# Patient Record
Sex: FEMALE | Race: WHITE | NOT HISPANIC OR LATINO | Employment: STUDENT | URBAN - METROPOLITAN AREA
[De-identification: names, ages, dates, MRNs, and addresses within clinical notes are randomized per-mention and may not be internally consistent; named-entity substitution may affect disease eponyms.]

---
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
## 2025-03-18 ENCOUNTER — OFFICE VISIT (OUTPATIENT)
Dept: PULMONOLOGY | Facility: CLINIC | Age: 20
End: 2025-03-18
Payer: COMMERCIAL

## 2025-03-18 VITALS
DIASTOLIC BLOOD PRESSURE: 90 MMHG | HEIGHT: 66 IN | WEIGHT: 172 LBS | TEMPERATURE: 98.6 F | SYSTOLIC BLOOD PRESSURE: 134 MMHG | RESPIRATION RATE: 18 BRPM | OXYGEN SATURATION: 97 % | HEART RATE: 90 BPM | BODY MASS INDEX: 27.64 KG/M2

## 2025-03-18 DIAGNOSIS — R06.02 SHORTNESS OF BREATH: Primary | ICD-10-CM

## 2025-03-18 DIAGNOSIS — J45.30 MILD PERSISTENT ASTHMA WITHOUT COMPLICATION: ICD-10-CM

## 2025-03-18 PROCEDURE — 99204 OFFICE O/P NEW MOD 45 MIN: CPT | Performed by: INTERNAL MEDICINE

## 2025-03-18 RX ORDER — BUDESONIDE AND FORMOTEROL FUMARATE DIHYDRATE 80; 4.5 UG/1; UG/1
2 AEROSOL RESPIRATORY (INHALATION) 2 TIMES DAILY
Qty: 10.2 G | Refills: 11 | Status: SHIPPED | OUTPATIENT
Start: 2025-03-18

## 2025-03-18 RX ORDER — LEVALBUTEROL TARTRATE 45 UG/1
3 AEROSOL, METERED ORAL ONCE
Status: COMPLETED | OUTPATIENT
Start: 2025-03-18 | End: 2025-03-18

## 2025-03-18 RX ADMIN — LEVALBUTEROL TARTRATE 3 PUFF: 45 AEROSOL, METERED ORAL at 15:44

## 2025-03-18 NOTE — PROGRESS NOTES
New Patient Pulmonary Office Visit      Patient Name: Harriet Alvarez    Referring Physician: Ashlyn Morales, *    Chief Complaint:    Chief Complaint   Patient presents with    Wheezing    Shortness of Breath    Cough       History of Present Illness: Harriet Alvarez is a 20 y.o. female who is here today to establish care with Pulmonary.  Patient has significant past medical history.  He was referred to pulmonary for evaluation of wheezing.  Patient states that she has been getting sick quite a bit here recently denies any chest pain, nausea, fever, or chills.  Patient does vape.    Review of Systems:   Review of Systems   Constitutional:  Negative for chills, fatigue and fever.   HENT:  Negative for congestion and voice change.    Eyes:  Negative for blurred vision.   Respiratory:  Negative for cough, shortness of breath and wheezing.    Cardiovascular:  Negative for chest pain.   Skin:  Negative for dry skin.   Hematological:  Negative for adenopathy.   Psychiatric/Behavioral:  Negative for agitation and depressed mood.        Past Medical History:   Past Medical History:   Diagnosis Date    Pneumonia        Past Surgical History:   Past Surgical History:   Procedure Laterality Date    ADENOIDECTOMY      TONSILLECTOMY         Family History: History reviewed. No pertinent family history.    Social History:   Social History     Socioeconomic History    Marital status: Single   Tobacco Use    Smoking status: Never     Passive exposure: Never    Smokeless tobacco: Never   Vaping Use    Vaping status: Every Day    Substances: Nicotine, THC, Flavoring    Devices: Disposable    Passive vaping exposure: Yes   Substance and Sexual Activity    Alcohol use: Never    Drug use: Never    Sexual activity: Defer       Medications:     Current Outpatient Medications:     albuterol sulfate  (90 Base) MCG/ACT inhaler, Inhale 2 puffs Every 6 (Six) Hours As Needed for Wheezing or Shortness of Air., Disp: 6.7 g, Rfl:  "0    norgestimate-ethinyl estradiol (Sprintec 28) 0.25-35 MG-MCG per tablet, Take 1 tablet by mouth Daily., Disp: , Rfl:     PARoxetine (PAXIL) 30 MG tablet, , Disp: , Rfl:     budesonide-formoterol (SYMBICORT) 80-4.5 MCG/ACT inhaler, Inhale 2 puffs 2 (Two) Times a Day., Disp: 10.2 g, Rfl: 11    Allergies:   No Known Allergies    Physical Exam:  Vital Signs:   Vitals:    03/18/25 1134   BP: 134/90   BP Location: Left arm   Patient Position: Sitting   Cuff Size: Adult   Pulse: 90   Resp: 18   Temp: 98.6 °F (37 °C)   SpO2: 97%  Comment: room air at resting   Weight: 78 kg (172 lb)   Height: 167.6 cm (65.98\")       Physical Exam  Vitals and nursing note reviewed.   Constitutional:       General: She is not in acute distress.     Appearance: She is well-developed and normal weight. She is not ill-appearing or toxic-appearing.   HENT:      Head: Normocephalic and atraumatic.   Cardiovascular:      Rate and Rhythm: Normal rate and regular rhythm.      Pulses: Normal pulses.      Heart sounds: Normal heart sounds. No murmur heard.     No friction rub. No gallop.   Pulmonary:      Effort: Pulmonary effort is normal. No respiratory distress.      Breath sounds: Normal breath sounds. No wheezing, rhonchi or rales.   Musculoskeletal:      Right lower leg: No edema.      Left lower leg: No edema.   Skin:     General: Skin is warm and dry.   Neurological:      Mental Status: She is alert and oriented to person, place, and time.         Immunization History   Administered Date(s) Administered    31-influenza Vac Quardvalent Preservativ 11/12/2019    COVID-19 (PFIZER) 12YRS+ (COMIRNATY) 10/15/2024    COVID-19 (PFIZER) Purple Cap Monovalent 05/14/2021, 01/01/2022    DTaP 2004, 2004, 2004, 01/24/2006, 04/18/2008    DTaP, Unspecified 04/18/2008, 01/12/2009    FluMist 2-49yrs 12/17/2015    Fluzone (or Fluarix & Flulaval for VFC) >6mos 11/10/2017, 10/12/2022, 01/15/2024    HPV Quadrivalent 08/05/2014    Hep A, 2 Dose " 01/25/2018, 07/25/2018, 07/26/2018, 03/07/2019    Hep B, Adolescent or Pediatric 02/23/2009    Hep B, Unspecified 2004, 2004, 03/08/2005    HiB 2004, 2004, 05/08/2005    Hpv9 01/25/2018    IPV 2004, 2004, 2004, 04/18/2008, 01/12/2009    Influenza recombinant 10/15/2024    Influenza, Unspecified 09/01/2020    MMR 03/08/2005, 04/18/2008, 01/12/2009, 02/23/2009    Meningococcal MCV4P (Menactra) 01/25/2018, 01/27/2020    Tdap 08/07/2015, 01/25/2018    Trumenba(meningococcal B) 04/30/2020, 08/19/2021    Varicella 01/24/2006, 01/12/2009, 09/14/2012       Results Review:   - I personally reviewed the pts imaging from chest x-ray from 2/15/2025 shows no acute cardiopulmonary process.  - I personally reviewed the pts PFT from 3/18/2005 shows mild obstruction without restriction, normal DLCO.  - I personally reviewed the pts chart with regards to evaluation by urgent treatment center for acute viral illness.    Assessment / Plan:   Diagnoses and all orders for this visit:    1. Shortness of breath (Primary)  2. Mild persistent asthma without complication  -Patient appears to have asthma was noted on Symbicort 80 mcg 2 puffs twice daily with albuterol every 4 hours as needed.  Right now she is using albuterol 6 times a day.  She does find that there is benefit though.  I like her to try to come off of the Symbicort though in about 3 months to see if she has ongoing symptoms.  Also highly recommended to stop vaping.  Patient verbalized understanding agree with the plan.      Follow Up:   Return in about 4 months (around 7/18/2025).     NEREIDA Palacios, DO  Pulmonary and Critical Care Medicine  Note Electronically Signed    Part of this note may be an electronic transcription/translation of spoken language to printed text using the Dragon Dictation System.

## 2025-06-05 ENCOUNTER — HOSPITAL ENCOUNTER (EMERGENCY)
Facility: HOSPITAL | Age: 20
Discharge: HOME | End: 2025-06-05
Attending: EMERGENCY MEDICINE
Payer: COMMERCIAL

## 2025-06-05 VITALS
OXYGEN SATURATION: 96 % | RESPIRATION RATE: 18 BRPM | HEIGHT: 66 IN | SYSTOLIC BLOOD PRESSURE: 117 MMHG | BODY MASS INDEX: 28.12 KG/M2 | WEIGHT: 175 LBS | TEMPERATURE: 99 F | DIASTOLIC BLOOD PRESSURE: 76 MMHG | HEART RATE: 87 BPM

## 2025-06-05 DIAGNOSIS — R55 NEAR SYNCOPE: Primary | ICD-10-CM

## 2025-06-05 DIAGNOSIS — R55 POSTURAL DIZZINESS WITH NEAR SYNCOPE: ICD-10-CM

## 2025-06-05 DIAGNOSIS — R42 POSTURAL DIZZINESS WITH NEAR SYNCOPE: ICD-10-CM

## 2025-06-05 LAB
ALBUMIN SERPL-MCNC: 4.5 G/DL (ref 3.5–5.2)
ALBUMIN/GLOB SERPL: 1.4 G/DL
ALP SERPL-CCNC: 94 U/L (ref 39–117)
ALT SERPL W P-5'-P-CCNC: 22 U/L (ref 1–33)
ANION GAP SERPL CALCULATED.3IONS-SCNC: 13 MMOL/L (ref 5–15)
AST SERPL-CCNC: 26 U/L (ref 1–32)
BACTERIA UR QL AUTO: ABNORMAL /HPF
BASOPHILS # BLD AUTO: 0.05 10*3/MM3 (ref 0–0.2)
BASOPHILS NFR BLD AUTO: 0.6 % (ref 0–1.5)
BILIRUB SERPL-MCNC: 0.4 MG/DL (ref 0–1.2)
BILIRUB UR QL STRIP: NEGATIVE
BUN SERPL-MCNC: 7.9 MG/DL (ref 6–20)
BUN/CREAT SERPL: 11 (ref 7–25)
CALCIUM SPEC-SCNC: 9.9 MG/DL (ref 8.6–10.5)
CHLORIDE SERPL-SCNC: 103 MMOL/L (ref 98–107)
CLARITY UR: ABNORMAL
CO2 SERPL-SCNC: 25 MMOL/L (ref 22–29)
COD CRY URNS QL: ABNORMAL /HPF
COLOR UR: YELLOW
CREAT SERPL-MCNC: 0.72 MG/DL (ref 0.57–1)
DEPRECATED RDW RBC AUTO: 40 FL (ref 37–54)
EGFRCR SERPLBLD CKD-EPI 2021: 122.2 ML/MIN/1.73
EOSINOPHIL # BLD AUTO: 0.1 10*3/MM3 (ref 0–0.4)
EOSINOPHIL NFR BLD AUTO: 1.1 % (ref 0.3–6.2)
ERYTHROCYTE [DISTWIDTH] IN BLOOD BY AUTOMATED COUNT: 12.4 % (ref 12.3–15.4)
GLOBULIN UR ELPH-MCNC: 3.3 GM/DL
GLUCOSE BLDC GLUCOMTR-MCNC: 114 MG/DL (ref 70–130)
GLUCOSE SERPL-MCNC: 96 MG/DL (ref 65–99)
GLUCOSE UR STRIP-MCNC: NEGATIVE MG/DL
HCG INTACT+B SERPL-ACNC: <0.1 MIU/ML
HCT VFR BLD AUTO: 43.9 % (ref 34–46.6)
HGB BLD-MCNC: 14.8 G/DL (ref 12–15.9)
HGB UR QL STRIP.AUTO: NEGATIVE
HOLD SPECIMEN: NORMAL
HYALINE CASTS UR QL AUTO: ABNORMAL /LPF
IMM GRANULOCYTES # BLD AUTO: 0.02 10*3/MM3 (ref 0–0.05)
IMM GRANULOCYTES NFR BLD AUTO: 0.2 % (ref 0–0.5)
KETONES UR QL STRIP: ABNORMAL
LEUKOCYTE ESTERASE UR QL STRIP.AUTO: ABNORMAL
LYMPHOCYTES # BLD AUTO: 2.89 10*3/MM3 (ref 0.7–3.1)
LYMPHOCYTES NFR BLD AUTO: 33 % (ref 19.6–45.3)
MCH RBC QN AUTO: 29.8 PG (ref 26.6–33)
MCHC RBC AUTO-ENTMCNC: 33.7 G/DL (ref 31.5–35.7)
MCV RBC AUTO: 88.5 FL (ref 79–97)
MONOCYTES # BLD AUTO: 0.83 10*3/MM3 (ref 0.1–0.9)
MONOCYTES NFR BLD AUTO: 9.5 % (ref 5–12)
MUCOUS THREADS URNS QL MICRO: ABNORMAL /HPF
NEUTROPHILS NFR BLD AUTO: 4.86 10*3/MM3 (ref 1.7–7)
NEUTROPHILS NFR BLD AUTO: 55.6 % (ref 42.7–76)
NITRITE UR QL STRIP: NEGATIVE
NRBC BLD AUTO-RTO: 0 /100 WBC (ref 0–0.2)
PH UR STRIP.AUTO: 5.5 [PH] (ref 5–8)
PLATELET # BLD AUTO: 294 10*3/MM3 (ref 140–450)
PMV BLD AUTO: 9.6 FL (ref 6–12)
POTASSIUM SERPL-SCNC: 3.9 MMOL/L (ref 3.5–5.2)
PROT SERPL-MCNC: 7.8 G/DL (ref 6–8.5)
PROT UR QL STRIP: ABNORMAL
QT INTERVAL: 448 MS
QTC INTERVAL: 473 MS
RBC # BLD AUTO: 4.96 10*6/MM3 (ref 3.77–5.28)
RBC # UR STRIP: ABNORMAL /HPF
REF LAB TEST METHOD: ABNORMAL
SODIUM SERPL-SCNC: 141 MMOL/L (ref 136–145)
SP GR UR STRIP: >=1.03 (ref 1–1.03)
SQUAMOUS #/AREA URNS HPF: ABNORMAL /HPF
UROBILINOGEN UR QL STRIP: ABNORMAL
WBC # UR STRIP: ABNORMAL /HPF
WBC NRBC COR # BLD AUTO: 8.75 10*3/MM3 (ref 3.4–10.8)
WHOLE BLOOD HOLD COAG: NORMAL
WHOLE BLOOD HOLD SPECIMEN: NORMAL

## 2025-06-05 PROCEDURE — 85025 COMPLETE CBC W/AUTO DIFF WBC: CPT | Performed by: EMERGENCY MEDICINE

## 2025-06-05 PROCEDURE — 99284 EMERGENCY DEPT VISIT MOD MDM: CPT

## 2025-06-05 PROCEDURE — 80053 COMPREHEN METABOLIC PANEL: CPT | Performed by: EMERGENCY MEDICINE

## 2025-06-05 PROCEDURE — 93005 ELECTROCARDIOGRAM TRACING: CPT | Performed by: EMERGENCY MEDICINE

## 2025-06-05 PROCEDURE — 82948 REAGENT STRIP/BLOOD GLUCOSE: CPT

## 2025-06-05 PROCEDURE — 84702 CHORIONIC GONADOTROPIN TEST: CPT | Performed by: EMERGENCY MEDICINE

## 2025-06-05 PROCEDURE — 81001 URINALYSIS AUTO W/SCOPE: CPT | Performed by: EMERGENCY MEDICINE

## 2025-06-05 RX ORDER — ONDANSETRON 4 MG/1
4 TABLET, ORALLY DISINTEGRATING ORAL EVERY 6 HOURS PRN
Qty: 12 TABLET | Refills: 0 | Status: SHIPPED | OUTPATIENT
Start: 2025-06-05

## 2025-06-05 RX ORDER — SODIUM CHLORIDE 0.9 % (FLUSH) 0.9 %
10 SYRINGE (ML) INJECTION AS NEEDED
Status: DISCONTINUED | OUTPATIENT
Start: 2025-06-05 | End: 2025-06-06 | Stop reason: HOSPADM

## 2025-06-05 NOTE — ED PROVIDER NOTES
" EMERGENCY DEPARTMENT ENCOUNTER    Pt Name: Harriet Alvarez  MRN: [id number]  Pt :   2005  Room Number:    Date of encounter:  2025  PCP: Ramandeep Nunez APRN  ED Provider: Parbhu Becker MD    Historian: patient and sister      HPI:  Chief Complaint: Near syncope        Context: Harriet Alvarez is a 20 y.o. female who presents to the ED c/o symptoms with onset at work.  The patient was feeling nauseated and vomited 1 time.  She then felt as if her symptoms were gone and she had lunch.  Soon thereafter she became quite lightheaded and dizzy.  She appeared alert somewhat pale.  She had a near syncopal episode but did not completely lose consciousness or fall to the ground.  Vital signs were checked and found to be elevated prior to coming to the emergency department with blood pressure 156/82 with a heart rate of 117.  Her fingerstick blood sugar ranged between 84 and 118.  She has had only 1 syncopal episode several years ago.  The patient denies recreational drug use other than marijuana.  She vapes.  She drinks alcohol only on a \"social\" basis.  No chest pain, dyspnea.  The patient feels somewhat dehydrated and reports that she has not consumed as much water as she normally does on a daily basis.  Patient has a past medical history of asthma, depression, anxiety.    PAST MEDICAL HISTORY  Past Medical History:   Diagnosis Date   • Pneumonia          PAST SURGICAL HISTORY  Past Surgical History:   Procedure Laterality Date   • ADENOIDECTOMY     • TONSILLECTOMY           FAMILY HISTORY  History reviewed. No pertinent family history.      SOCIAL HISTORY  Social History     Socioeconomic History   • Marital status: Single   Tobacco Use   • Smoking status: Never     Passive exposure: Never   • Smokeless tobacco: Never   Vaping Use   • Vaping status: Every Day   • Substances: Nicotine, THC, Flavoring   • Devices: Disposable   • Passive vaping exposure: Yes   Substance and Sexual Activity   • " Alcohol use: Never   • Drug use: Never   • Sexual activity: Defer         ALLERGIES  Patient has no known allergies.        REVIEW OF SYSTEMS  Review of Systems       All systems reviewed and negative except for those discussed in HPI.       PHYSICAL EXAM    I have reviewed the triage vital signs and nursing notes.    ED Triage Vitals [06/05/25 1534]   Temp Heart Rate Resp BP SpO2   99 °F (37.2 °C) 101 18 139/67 99 %      Temp src Heart Rate Source Patient Position BP Location FiO2 (%)   Oral Monitor -- -- --       Physical Exam  GENERAL:   Appears pleasant, nontoxic.  HENT: Nares patent.  Dry mucous membrane  EYES: No scleral icterus.  CV: Regular rhythm, regular rate.  No murmurs gallops rubs  RESPIRATORY: Normal effort.  No audible wheezes, rales or rhonchi.  Clear to auscultation  ABDOMEN: Soft, nontender to deep palpation  MUSCULOSKELETAL: No deformities.   NEURO: Alert, moves all extremities, follows commands.  SKIN: Warm, dry, no rash visualized.      LAB RESULTS  Recent Results (from the past 24 hours)   POC Glucose Once    Collection Time: 06/05/25  3:37 PM    Specimen: Blood   Result Value Ref Range    Glucose 114 70 - 130 mg/dL   Comprehensive Metabolic Panel    Collection Time: 06/05/25  5:19 PM    Specimen: Blood   Result Value Ref Range    Glucose 96 65 - 99 mg/dL    BUN 7.9 6.0 - 20.0 mg/dL    Creatinine 0.72 0.57 - 1.00 mg/dL    Sodium 141 136 - 145 mmol/L    Potassium 3.9 3.5 - 5.2 mmol/L    Chloride 103 98 - 107 mmol/L    CO2 25.0 22.0 - 29.0 mmol/L    Calcium 9.9 8.6 - 10.5 mg/dL    Total Protein 7.8 6.0 - 8.5 g/dL    Albumin 4.5 3.5 - 5.2 g/dL    ALT (SGPT) 22 1 - 33 U/L    AST (SGOT) 26 1 - 32 U/L    Alkaline Phosphatase 94 39 - 117 U/L    Total Bilirubin 0.4 0.0 - 1.2 mg/dL    Globulin 3.3 gm/dL    A/G Ratio 1.4 g/dL    BUN/Creatinine Ratio 11.0 7.0 - 25.0    Anion Gap 13.0 5.0 - 15.0 mmol/L    eGFR 122.2 >60.0 mL/min/1.73   Green Top (Gel)    Collection Time: 06/05/25  5:19 PM   Result Value  Ref Range    Extra Tube Hold for add-ons.    Lavender Top    Collection Time: 06/05/25  5:19 PM   Result Value Ref Range    Extra Tube hold for add-on    Gold Top - SST    Collection Time: 06/05/25  5:19 PM   Result Value Ref Range    Extra Tube Hold for add-ons.    Gray Top    Collection Time: 06/05/25  5:19 PM   Result Value Ref Range    Extra Tube Hold for add-ons.    Light Blue Top    Collection Time: 06/05/25  5:19 PM   Result Value Ref Range    Extra Tube Hold for add-ons.    CBC Auto Differential    Collection Time: 06/05/25  5:19 PM    Specimen: Blood   Result Value Ref Range    WBC 8.75 3.40 - 10.80 10*3/mm3    RBC 4.96 3.77 - 5.28 10*6/mm3    Hemoglobin 14.8 12.0 - 15.9 g/dL    Hematocrit 43.9 34.0 - 46.6 %    MCV 88.5 79.0 - 97.0 fL    MCH 29.8 26.6 - 33.0 pg    MCHC 33.7 31.5 - 35.7 g/dL    RDW 12.4 12.3 - 15.4 %    RDW-SD 40.0 37.0 - 54.0 fl    MPV 9.6 6.0 - 12.0 fL    Platelets 294 140 - 450 10*3/mm3    Neutrophil % 55.6 42.7 - 76.0 %    Lymphocyte % 33.0 19.6 - 45.3 %    Monocyte % 9.5 5.0 - 12.0 %    Eosinophil % 1.1 0.3 - 6.2 %    Basophil % 0.6 0.0 - 1.5 %    Immature Grans % 0.2 0.0 - 0.5 %    Neutrophils, Absolute 4.86 1.70 - 7.00 10*3/mm3    Lymphocytes, Absolute 2.89 0.70 - 3.10 10*3/mm3    Monocytes, Absolute 0.83 0.10 - 0.90 10*3/mm3    Eosinophils, Absolute 0.10 0.00 - 0.40 10*3/mm3    Basophils, Absolute 0.05 0.00 - 0.20 10*3/mm3    Immature Grans, Absolute 0.02 0.00 - 0.05 10*3/mm3    nRBC 0.0 0.0 - 0.2 /100 WBC   hCG, Quantitative, Pregnancy    Collection Time: 06/05/25  5:19 PM    Specimen: Blood   Result Value Ref Range    HCG Quantitative <0.10 mIU/mL   Urinalysis With Microscopic If Indicated (No Culture) - Urine, Clean Catch    Collection Time: 06/05/25  5:22 PM    Specimen: Urine, Clean Catch   Result Value Ref Range    Color, UA Yellow Yellow, Straw    Appearance, UA Cloudy (A) Clear    pH, UA 5.5 5.0 - 8.0    Specific Gravity, UA >=1.030 1.001 - 1.030    Glucose, UA Negative  Negative    Ketones, UA Trace (A) Negative    Bilirubin, UA Negative Negative    Blood, UA Negative Negative    Protein, UA Trace (A) Negative    Leuk Esterase, UA Trace (A) Negative    Nitrite, UA Negative Negative    Urobilinogen, UA 1.0 E.U./dL 0.2 - 1.0 E.U./dL   Urinalysis, Microscopic Only - Urine, Clean Catch    Collection Time: 06/05/25  5:22 PM    Specimen: Urine, Clean Catch   Result Value Ref Range    RBC, UA None Seen None Seen, 0-2 /HPF    WBC, UA 3-5 (A) None Seen, 0-2 /HPF    Bacteria, UA None Seen None Seen, Trace /HPF    Squamous Epithelial Cells, UA 3-6 (A) None Seen, 0-2 /HPF    Hyaline Casts, UA 7-12 0 - 6 /LPF    Calcium Oxalate Crystals, UA Moderate/2+ None Seen /HPF    Mucus, UA Moderate/2+ (A) None Seen, Trace /HPF    Methodology Manual Light Microscopy    ECG 12 Lead ED Triage Standing Order; Weak / Dizzy / AMS    Collection Time: 06/05/25  5:28 PM   Result Value Ref Range    QT Interval 448 ms    QTC Interval 473 ms       If labs were ordered, I independently reviewed the results and considered them in treating the patient.        RADIOLOGY  No Radiology Exams Resulted Within Past 24 Hours      PROCEDURES    Procedures    ECG 12 Lead ED Triage Standing Order; Weak / Dizzy / AMS   Final Result   Test Reason : ED Triage Standing Order~   Blood Pressure :   */*   mmHG   Vent. Rate :  67 BPM     Atrial Rate :  67 BPM      P-R Int : 142 ms          QRS Dur :  78 ms       QT Int : 448 ms       P-R-T Axes :  43  47  52 degrees     QTcB Int : 473 ms      Normal sinus rhythm with sinus arrhythmia   Normal ECG   No previous ECGs available   Confirmed by ZURDO BARROW MD ([id number]) on 6/5/2025 8:48:25 PM      Referred By: ED MD           Confirmed By: ZURDO BARROW MD          MEDICATIONS GIVEN IN ER    Medications   sodium chloride 0.9 % flush 10 mL (has no administration in time range)         MEDICAL DECISION MAKING, PROGRESS, and CONSULTS    All labs, if obtained, have been independently reviewed by  me.  All radiology studies, if obtained, have been reviewed by me and the radiologist dictating the report.  All EKGs, if obtained, have been independently viewed and interpreted by me/my attending physician.      Discussion below represents my analysis of pertinent findings related to patient's condition, differential diagnosis, treatment plan and final disposition.                                          Differential diagnosis:    Presyncope likely secondary to dehydration, work-related stress, upright position.  Other more serious etiologies such as hypertensive emergency, significant cardiac arrhythmia, etc. considered as well      Additional sources:    - Discussed/ obtained information from independent historians: Patient's sister who works in labor and delivery here at [facility] brought her to the emergency department and accompanies her.  I spoke directly with that patient's sister.    - External (non-ED) record review: Outpatient chest x-ray dated 2/15/2025 showed no acute abnormalities    - Chronic or social conditions impacting care: Vapes and smokes marijuana.        Orders placed during this visit:  Orders Placed This Encounter   Procedures   • [place] Draw   • Comprehensive Metabolic Panel   • Urinalysis With Microscopic If Indicated (No Culture) - Urine, Clean Catch   • CBC Auto Differential   • hCG, Quantitative, Pregnancy   • Urinalysis, Microscopic Only - Urine, Clean Catch   • Ambulatory Referral to [facility] - Syncope [facility]   • NPO Diet NPO Type: Strict NPO   • Undress & Gown   • Continuous Pulse Oximetry   • Vital Signs   • Oxygen Therapy- Nasal Cannula; Titrate 1-6 LPM Per SpO2; 90 - 95%   • POC Glucose Once   • ECG 12 Lead ED Triage Standing Order; Weak / Dizzy / AMS   • Insert Peripheral IV   • Fall Precautions   • CBC & Differential   • Green Top (Gel)   • Lavender Top   • Gold Top - SST   • Gray Top   • Light Blue Top         Additional orders considered but not ordered:  CT  head    ED Course:    Consultants:      ED Course as of 06/06/25 0119   Thu Jun 05, 2025   3609 I evaluate this patient in our triage area as we have no beds for rooming. [MS]   2156 Patient anxious to be discharged as soon as possible.  At this stage the patient appears stable.  We are able to move her into a room from the lobby now.  She states that her symptoms are resolved. [MS]   2201 Orally hydrating. [MS]      ED Course User Index  [MS] Prabhu Becker MD              Shared Decision Making:  After my consideration of clinical presentation and any laboratory/radiology studies obtained, I discussed the findings with the patient/patient representative who is in agreement with the treatment plan and the final disposition.   Risks and benefits of discharge and/or observation/admission were discussed.       AS OF 22:01 EDT VITALS:    BP - 117/76  HR - 87  TEMP - 99 °F (37.2 °C) (Oral)  O2 SATS - 96%                  DIAGNOSIS  Final diagnoses:   Near syncope   Postural dizziness with near syncope         DISPOSITION  DISCHARGE    Patient discharged in stable condition.    Reviewed implications of results, diagnosis, meds, responsibility to follow up, warning signs and symptoms of possible worsening, potential complications and reasons to return to ER.    Patient/Family voiced understanding of above instructions.    Discussed plan for discharge, as there is no emergent indication for admission.  Pt/family is agreeable and understands need for follow up and possible repeat testing.  Pt/family is aware that discharge does not mean that nothing is wrong but that it indicates no emergency is currently present that requires admission and they must continue care with follow-up as given below or with a physician of their choice.     FOLLOW-UP  [facility] CARDIOLOGY  [address]  [address]  [facility] [address]  [phone]        Ramandeep Nunez, APRN  [address] [facility]  [place] [address]  [phone]      NEXT AVAILABLE APPOINTMENT - RECHECK OF CONDITION    [facility] EMERGENCY DEPARTMENT  [address] [facility] [address] [phone]    IF YOU HAVE ANY CONCERN OF WORSENING CONDITION         Medication List        New Prescriptions      ondansetron ODT 4 MG disintegrating tablet  Commonly known as: ZOFRAN-ODT  Place 1 tablet on the tongue Every 6 (Six) Hours As Needed for Nausea or Vomiting. As needed for nausea               Where to Get Your Medications        These medications were sent to [facility] [id number] - [place] [id number] [facility] AT [address] [phone] [place] [id number] [place] [id number]      Phone: [phone]   ondansetron ODT 4 MG disintegrating tablet             Please note that portions of this document were completed with voice recognition software.        Prabhu Becker MD  06/06/25 0119

## 2025-06-06 NOTE — DISCHARGE INSTRUCTIONS
Your labs and other studies have come back looking good.  Because this is a new problem for you I am referring you to our syncope clinic.  I put in a formal referral and they should be calling you within the next few days to set up outpatient follow-up.    In the meantime make sure that you push fluids quite well.  If your urine is even a little yellow you may be dehydrated and need to increase fluid intake.    If you develop lightheadedness in the upright position immediately lie down and raise your legs.    If you have any concern of worsening condition please return to the emergency department.    Please review the medications you are supposed to be taking according to prior physician recommendations. I have not changed your home medications during this visit. If your discharge instructions indicate that I have changed your home medications, this is not the case, and you should disregard. If you have any questions about the medication you should be taking at home, please call your physician.

## 2025-06-11 ENCOUNTER — OFFICE VISIT (OUTPATIENT)
Dept: CARDIOLOGY | Facility: CLINIC | Age: 20
End: 2025-06-11
Payer: COMMERCIAL

## 2025-06-11 VITALS
OXYGEN SATURATION: 95 % | SYSTOLIC BLOOD PRESSURE: 126 MMHG | DIASTOLIC BLOOD PRESSURE: 66 MMHG | HEART RATE: 99 BPM | HEIGHT: 66 IN | BODY MASS INDEX: 42.11 KG/M2 | WEIGHT: 262 LBS

## 2025-06-11 DIAGNOSIS — R42 POSTURAL DIZZINESS WITH PRESYNCOPE: Primary | ICD-10-CM

## 2025-06-11 DIAGNOSIS — R55 POSTURAL DIZZINESS WITH PRESYNCOPE: Primary | ICD-10-CM

## 2025-06-11 PROCEDURE — 99204 OFFICE O/P NEW MOD 45 MIN: CPT | Performed by: INTERNAL MEDICINE

## 2025-06-11 NOTE — PROGRESS NOTES
"[facility] Cardiology  Consultation H&P  Harriet Alvarez  2005  [phone]  There is no work phone number on file..    VISIT DATE:  06/11/2025    PCP: Ramandeep Nunez, APRN  [address] [id number]    CC:  Chief Complaint   Patient presents with    Syncope     Near syncope.Pt reports her near-syncope episodes have become more frequent and more at random. Rather than just when standing or bending over.     Patient reports BLE edema         ASSESSMENT:   Diagnosis Plan   1. Postural dizziness with presyncope  Holter Monitor - 48 Hour            PLAN:  Orthostasis versus vasovagal presyncope: 48-hour Holter monitor pending.  No clinical suspicion for underlying structural or functional heart disease.  Offered reassurance.  Discussed avoiding dehydration.  Encouraged regular exercise.  Will follow-up on an as-needed basis.    History of Present Illness   20-year-old female with recent episode of presyncope.  Reports that she woke up feeling funny 1 morning.  Some mild lightheadedness.  Symptoms persisted while she was at work.  Did have some transient nausea.  Developed near syncope after going from a seated to a standing position.  Reviewed ED evaluation.  Symptoms spontaneous resolved during her ED evaluation.  No recurrence.  Denies exertional chest discomfort, palpitations or dyspnea.  No inciting events leading up to this episode.  She does report intermittent mild orthostasis.  Does not check blood pressures at home.  Previous cocaine and LSD use 2 to 3 years ago, no IV drug use.    PHYSICAL EXAMINATION:  Vitals:    06/11/25 1303   BP: 126/66   BP Location: Right arm   Patient Position: Sitting   Cuff Size: Adult   Pulse: 99   SpO2: 95%   Weight: 119 kg (262 lb)   Height: 167.6 cm (66\")     General Appearance:    Alert, cooperative, no distress, appears stated age   Head:    Normocephalic, without obvious abnormality, atraumatic   Eyes:    conjunctiva/corneas " "clear, EOM's intact, fundi     benign, both eyes   Ears:    Normal TM's and external ear canals, both ears   Nose:   Nares normal, septum midline, mucosa normal, no drainage    or sinus tenderness   Throat:   Lips, mucosa, and tongue normal; teeth and gums normal   Neck:   Supple, symmetrical, trachea midline, no adenopathy;     thyroid:  no enlargement/tenderness/nodules; no carotid    bruit or JVD   Back:     Symmetric, no curvature, ROM normal, no CVA tenderness   Lungs:     Clear to auscultation bilaterally, respirations unlabored   Chest Wall:    No tenderness or deformity    Heart:    Regular rate and rhythm, S1 and S2 normal, no murmur, rub   or gallop, normal carotid impulse bilaterally without bruit.   Abdomen:     Soft, non-tender, bowel sounds active all four quadrants,     no masses, no organomegaly   Extremities:   Extremities normal, atraumatic, no cyanosis or edema   Pulses:   2+ and symmetric all extremities   Skin:   Skin color, texture, turgor normal, no rashes or lesions   Lymph nodes:   Cervical, supraclavicular, and axillary nodes normal   Neurologic:   normal strength, sensation intact     throughout       Diagnostic Data:  Procedures  No results found for: \"CHLPL\", \"TRIG\", \"HDL\", \"LDLDIRECT\"  Lab Results   Component Value Date    GLUCOSE 96 06/05/2025    BUN 7.9 06/05/2025    CREATININE 0.72 06/05/2025     06/05/2025    K 3.9 06/05/2025     06/05/2025    CO2 25.0 06/05/2025     No results found for: \"HGBA1C\"  Lab Results   Component Value Date    WBC 8.75 06/05/2025    HGB 14.8 06/05/2025    HCT 43.9 06/05/2025     06/05/2025       PROBLEM LIST:  There is no problem list on file for this patient.      PAST MEDICAL HX  Past Medical History:   Diagnosis Date    Asthma     Pneumonia        Allergies  No Known Allergies    Current Medications    Current Outpatient Medications:     budesonide-formoterol (SYMBICORT) 80-4.5 MCG/ACT inhaler, Inhale 2 puffs 2 (Two) Times a Day., " Disp: 10.2 g, Rfl: 11    norgestimate-ethinyl estradiol (Sprintec 28) 0.25-35 MG-MCG per tablet, Take 1 tablet by mouth Daily., Disp: , Rfl:     ondansetron ODT (ZOFRAN-ODT) 4 MG disintegrating tablet, Place 1 tablet on the tongue Every 6 (Six) Hours As Needed for Nausea or Vomiting. As needed for nausea, Disp: 12 tablet, Rfl: 0    PARoxetine (PAXIL) 30 MG tablet, , Disp: , Rfl:          ROS  ROS      SOCIAL HX  Social History     Socioeconomic History    Marital status: Single   Tobacco Use    Smoking status: Every Day     Passive exposure: Never    Smokeless tobacco: Never   Vaping Use    Vaping status: Every Day    Substances: Nicotine, THC, Flavoring    Devices: Disposable    Passive vaping exposure: Yes   Substance and Sexual Activity    Alcohol use: Never    Drug use: Yes     Types: Cocaine(coke), Marijuana    Sexual activity: Never       FAMILY HX  Family History   Problem Relation Age of Onset    Heart attack Maternal Grandmother              Daniel Ovalle III, MD, FACC

## 2025-08-13 ENCOUNTER — HOSPITAL ENCOUNTER (OUTPATIENT)
Dept: RADIOLOGY | Facility: CLINIC | Age: 20
Discharge: HOME | End: 2025-08-13
Payer: COMMERCIAL

## 2025-08-13 DIAGNOSIS — M25.522 ELBOW PAIN, LEFT: ICD-10-CM

## 2025-08-13 PROCEDURE — 73080 X-RAY EXAM OF ELBOW: CPT | Performed by: FAMILY MEDICINE
